# Patient Record
Sex: MALE | Race: BLACK OR AFRICAN AMERICAN | Employment: OTHER | URBAN - METROPOLITAN AREA
[De-identification: names, ages, dates, MRNs, and addresses within clinical notes are randomized per-mention and may not be internally consistent; named-entity substitution may affect disease eponyms.]

---

## 2024-10-25 ENCOUNTER — OFFICE VISIT (OUTPATIENT)
Age: 74
End: 2024-10-25

## 2024-10-25 VITALS
HEIGHT: 74 IN | DIASTOLIC BLOOD PRESSURE: 76 MMHG | SYSTOLIC BLOOD PRESSURE: 156 MMHG | TEMPERATURE: 97.8 F | RESPIRATION RATE: 16 BRPM | BODY MASS INDEX: 29.69 KG/M2 | OXYGEN SATURATION: 97 % | HEART RATE: 58 BPM | WEIGHT: 231.3 LBS

## 2024-10-25 DIAGNOSIS — Z79.01 LONG TERM (CURRENT) USE OF ANTICOAGULANTS: ICD-10-CM

## 2024-10-25 DIAGNOSIS — E78.00 HYPERCHOLESTEREMIA: ICD-10-CM

## 2024-10-25 DIAGNOSIS — I10 PRIMARY HYPERTENSION: Primary | ICD-10-CM

## 2024-10-25 DIAGNOSIS — R00.2 PALPITATIONS: ICD-10-CM

## 2024-10-25 DIAGNOSIS — R94.31 ABNORMAL ECG: ICD-10-CM

## 2024-10-25 DIAGNOSIS — I48.0 PAROXYSMAL ATRIAL FIBRILLATION (HCC): ICD-10-CM

## 2024-10-25 DIAGNOSIS — R01.1 SYSTOLIC MURMUR: ICD-10-CM

## 2024-10-25 DIAGNOSIS — I35.9 AORTIC VALVE DISEASE: ICD-10-CM

## 2024-10-25 RX ORDER — ATORVASTATIN CALCIUM 40 MG/1
1 TABLET, FILM COATED ORAL DAILY
COMMUNITY

## 2024-10-25 RX ORDER — METOPROLOL SUCCINATE 25 MG/1
1 TABLET, EXTENDED RELEASE ORAL DAILY
COMMUNITY

## 2024-10-25 RX ORDER — ACETAMINOPHEN 160 MG
2000 TABLET,DISINTEGRATING ORAL DAILY
COMMUNITY

## 2024-10-25 RX ORDER — APIXABAN 5 MG/1
1 TABLET, FILM COATED ORAL 2 TIMES DAILY
COMMUNITY

## 2024-10-25 RX ORDER — METFORMIN HYDROCHLORIDE 500 MG/1
500 TABLET, EXTENDED RELEASE ORAL DAILY
COMMUNITY
Start: 2024-10-08

## 2024-10-25 ASSESSMENT — ENCOUNTER SYMPTOMS
SHORTNESS OF BREATH: 0
ALLERGIC/IMMUNOLOGIC NEGATIVE: 1
EYES NEGATIVE: 1
GASTROINTESTINAL NEGATIVE: 1

## 2024-10-25 ASSESSMENT — LIFESTYLE VARIABLES
HOW MANY STANDARD DRINKS CONTAINING ALCOHOL DO YOU HAVE ON A TYPICAL DAY: 1 OR 2
HOW OFTEN DO YOU HAVE A DRINK CONTAINING ALCOHOL: MONTHLY OR LESS

## 2024-10-25 NOTE — PROGRESS NOTES
1. \"Have you been to the ER, urgent care clinic since your last visit?  Hospitalized since your last visit?\" Reviewed by Dr. Og Spaulding     2. \"Have you seen or consulted any other health care providers outside of the [facility] since your last visit?\" Reviewed by Dr. Og Spaulding

## 2024-10-25 NOTE — PROGRESS NOTES
Clif Butt  (:  1950) is a 74 y.o. male,New patient, here for evaluation of the following chief complaint(s):  New Patient (Referral from pcp Dr. Demarco Najera: Atrial Fibrillation)        Subjective   SUBJECTIVE/OBJECTIVE:  History of Present Illness  The patient presents for evaluation of atrial fibrillation.    He was diagnosed with atrial fibrillation and an irregular heartbeat during a routine physical examination on 2024, confirmed by an EKG performed by Dr. Dodd. His smartwatch had previously indicated atrial fibrillation in 2021. He experiences occasional chest heaviness, approximately once every few months. He has no history of coronary disease, heart blockages, or heart attacks. He was tested for thyroid issues last year, which returned negative results. He was informed of arterial stenosis in his heart following a sonogram at the cancer center. He is currently on Eliquis for atrial fibrillation, as well as medication for blood pressure and prediabetes, specifically metformin.     He maintains an active lifestyle, including swimming and using a treadmill at home, without any limitations.    He also has a keloid that causes discomfort.    He broke a bone in his foot after falling off a ladder in 2023. He had a dermatitis type condition and was given medication for that, which cleared it up.    SOCIAL HISTORY  The patient denies smoking.    Past Medical History:   Diagnosis Date    Atrial fibrillation (HCC)     Constipation     Diabetes mellitus (HCC)     ED (erectile dysfunction) of organic origin     Hematuria, microscopic     Hypertension     Prostate cancer (HCC)     T3a NoMx alma 6 adenoacarcinoma of the prostate s/p RRP 3/2002 with positive focal apical margin        Past Surgical History:   Procedure Laterality Date    PROSTATECTOMY          No Known Allergies     Current Outpatient Medications   Medication Sig Dispense Refill    metFORMIN (GLUCOPHAGE-XR) 500

## 2025-04-29 ENCOUNTER — OFFICE VISIT (OUTPATIENT)
Age: 75
End: 2025-04-29
Payer: MEDICARE

## 2025-04-29 VITALS
SYSTOLIC BLOOD PRESSURE: 131 MMHG | WEIGHT: 234 LBS | HEIGHT: 74 IN | TEMPERATURE: 97 F | HEART RATE: 62 BPM | DIASTOLIC BLOOD PRESSURE: 85 MMHG | BODY MASS INDEX: 30.03 KG/M2

## 2025-04-29 DIAGNOSIS — R94.31 ABNORMAL ECG: ICD-10-CM

## 2025-04-29 DIAGNOSIS — E78.00 HYPERCHOLESTEREMIA: ICD-10-CM

## 2025-04-29 DIAGNOSIS — R00.2 PALPITATIONS: ICD-10-CM

## 2025-04-29 DIAGNOSIS — R01.1 SYSTOLIC MURMUR: ICD-10-CM

## 2025-04-29 DIAGNOSIS — Z79.01 LONG TERM (CURRENT) USE OF ANTICOAGULANTS: ICD-10-CM

## 2025-04-29 DIAGNOSIS — I48.0 PAROXYSMAL ATRIAL FIBRILLATION (HCC): ICD-10-CM

## 2025-04-29 DIAGNOSIS — I35.9 AORTIC VALVE DISEASE: Primary | ICD-10-CM

## 2025-04-29 DIAGNOSIS — I10 PRIMARY HYPERTENSION: ICD-10-CM

## 2025-04-29 PROCEDURE — 1126F AMNT PAIN NOTED NONE PRSNT: CPT | Performed by: INTERNAL MEDICINE

## 2025-04-29 PROCEDURE — 3017F COLORECTAL CA SCREEN DOC REV: CPT | Performed by: INTERNAL MEDICINE

## 2025-04-29 PROCEDURE — 1159F MED LIST DOCD IN RCRD: CPT | Performed by: INTERNAL MEDICINE

## 2025-04-29 PROCEDURE — G8417 CALC BMI ABV UP PARAM F/U: HCPCS | Performed by: INTERNAL MEDICINE

## 2025-04-29 PROCEDURE — 3075F SYST BP GE 130 - 139MM HG: CPT | Performed by: INTERNAL MEDICINE

## 2025-04-29 PROCEDURE — 1160F RVW MEDS BY RX/DR IN RCRD: CPT | Performed by: INTERNAL MEDICINE

## 2025-04-29 PROCEDURE — 3079F DIAST BP 80-89 MM HG: CPT | Performed by: INTERNAL MEDICINE

## 2025-04-29 PROCEDURE — G8427 DOCREV CUR MEDS BY ELIG CLIN: HCPCS | Performed by: INTERNAL MEDICINE

## 2025-04-29 PROCEDURE — 1123F ACP DISCUSS/DSCN MKR DOCD: CPT | Performed by: INTERNAL MEDICINE

## 2025-04-29 PROCEDURE — 99215 OFFICE O/P EST HI 40 MIN: CPT | Performed by: INTERNAL MEDICINE

## 2025-04-29 PROCEDURE — 1036F TOBACCO NON-USER: CPT | Performed by: INTERNAL MEDICINE

## 2025-04-29 ASSESSMENT — PATIENT HEALTH QUESTIONNAIRE - PHQ9
SUM OF ALL RESPONSES TO PHQ QUESTIONS 1-9: 0
1. LITTLE INTEREST OR PLEASURE IN DOING THINGS: NOT AT ALL
SUM OF ALL RESPONSES TO PHQ QUESTIONS 1-9: 0
2. FEELING DOWN, DEPRESSED OR HOPELESS: NOT AT ALL
SUM OF ALL RESPONSES TO PHQ QUESTIONS 1-9: 0
SUM OF ALL RESPONSES TO PHQ QUESTIONS 1-9: 0

## 2025-04-29 ASSESSMENT — ENCOUNTER SYMPTOMS
EYES NEGATIVE: 1
ALLERGIC/IMMUNOLOGIC NEGATIVE: 1
SHORTNESS OF BREATH: 0
GASTROINTESTINAL NEGATIVE: 1

## 2025-04-29 NOTE — PROGRESS NOTES
Clif Butt  (:  1950) is a 75 y.o. male,Established patient, here for evaluation of the following chief complaint(s):  6 Month Follow-Up      Subjective   SUBJECTIVE/OBJECTIVE:  History of Present Illness  The patient presents for evaluation of atrial fib, blood pressure management, aortic valve stenosis, and medication management. He was diagnosed with atrial fibrillation and an irregular heartbeat during a routine physical examination on 2024, confirmed by an EKG performed by Dr. Dodd. His smartwatch had previously indicated atrial fibrillation in 2021. He has no history of coronary disease or heart attacks. He was tested for thyroid issues last year, which returned negative results. He was informed of arterial stenosis in his heart following a sonogram at the cancer center. He is currently on Eliquis for atrial fibrillation, as well as medication for blood pressure and prediabetes, specifically metformin.     He reports no chest pain, shortness of breath, lightheadedness, or dizziness. No palpitations have been experienced. Mobility is generally good, with the exception of some limitations due to a previous foot injury.     On 2023, he fell from a ladder and broke a bone in his foot. The condition has been improving daily, allowing for walking without pain during the day, though occasional discomfort is noted when turning on the foot. Additionally, intermittent pain is reported at the site of a keloid scar.    Blood pressure is monitored at home, with readings ranging from 150/74 to 121/60. An elevated reading of 151/81 was noted at 8:35 AM, likely before taking medication.    Currently, he is on Eliquis but has temporarily discontinued it due to a scheduled tooth extraction procedure tomorrow.    SOCIAL HISTORY  Exercise: Recently started swimming again    I have carefully reviewed all available medical records, previous office notes, lab, x-ray and procedure reports    Past

## 2025-04-29 NOTE — PROGRESS NOTES
1. \"Have you been to the ER, urgent care clinic since your last visit?  Hospitalized since your last visit?\" Reviewed by Dr. Og Spaulding     2. \"Have you seen or consulted any other health care providers outside of the [facility] since your last visit?\" Reviewed by Dr. Og Spaulding